# Patient Record
Sex: FEMALE | URBAN - METROPOLITAN AREA
[De-identification: names, ages, dates, MRNs, and addresses within clinical notes are randomized per-mention and may not be internally consistent; named-entity substitution may affect disease eponyms.]

---

## 2018-04-23 ENCOUNTER — APPOINTMENT (RX ONLY)
Dept: URBAN - METROPOLITAN AREA CLINIC 137 | Facility: CLINIC | Age: 78
Setting detail: DERMATOLOGY
End: 2018-04-23

## 2018-04-23 VITALS
HEIGHT: 65 IN | WEIGHT: 150 LBS | HEART RATE: 72 BPM | SYSTOLIC BLOOD PRESSURE: 156 MMHG | RESPIRATION RATE: 16 BRPM | DIASTOLIC BLOOD PRESSURE: 101 MMHG

## 2018-04-23 DIAGNOSIS — L57.0 ACTINIC KERATOSIS: ICD-10-CM

## 2018-04-23 DIAGNOSIS — L82.1 OTHER SEBORRHEIC KERATOSIS: ICD-10-CM

## 2018-04-23 PROBLEM — Z85.828 PERSONAL HISTORY OF OTHER MALIGNANT NEOPLASM OF SKIN: Status: ACTIVE | Noted: 2018-04-23

## 2018-04-23 PROBLEM — L55.1 SUNBURN OF SECOND DEGREE: Status: ACTIVE | Noted: 2018-04-23

## 2018-04-23 PROCEDURE — 99203 OFFICE O/P NEW LOW 30 MIN: CPT | Mod: 25

## 2018-04-23 PROCEDURE — ? LIQUID NITROGEN (CM)

## 2018-04-23 PROCEDURE — 17000 DESTRUCT PREMALG LESION: CPT

## 2018-04-23 PROCEDURE — ? COUNSELING

## 2018-04-23 PROCEDURE — 17003 DESTRUCT PREMALG LES 2-14: CPT

## 2018-04-23 ASSESSMENT — LOCATION SIMPLE DESCRIPTION DERM
LOCATION SIMPLE: LEFT HAND
LOCATION SIMPLE: LEFT WRIST
LOCATION SIMPLE: RIGHT HAND

## 2018-04-23 ASSESSMENT — LOCATION DETAILED DESCRIPTION DERM
LOCATION DETAILED: LEFT DORSAL WRIST
LOCATION DETAILED: LEFT ULNAR DORSAL HAND
LOCATION DETAILED: LEFT DORSAL SMALL FINGER METACARPOPHALANGEAL JOINT
LOCATION DETAILED: LEFT RADIAL DORSAL HAND
LOCATION DETAILED: LEFT DORSAL MIDDLE FINGER METACARPOPHALANGEAL JOINT
LOCATION DETAILED: RIGHT RADIAL DORSAL HAND

## 2018-04-23 ASSESSMENT — LOCATION ZONE DERM
LOCATION ZONE: HAND
LOCATION ZONE: ARM

## 2018-04-23 ASSESSMENT — PAIN INTENSITY VAS: HOW INTENSE IS YOUR PAIN 0 BEING NO PAIN, 10 BEING THE MOST SEVERE PAIN POSSIBLE?: NO PAIN

## 2018-04-23 NOTE — PROCEDURE: LIQUID NITROGEN
Duration Of Freeze Thaw-Cycle (Seconds): 10
Size Of Lesion In Cm (Optional): 0.8
X Size Of Lesion In Cm (Optional): 0
Post-Care Instructions: I reviewed with the patient in detail post-care instructions. Patient is to wear sunprotection, and avoid picking at any of the treated lesions. Pt may apply Vaseline to crusted or scabbing areas.
Size Of Lesion In Cm (Optional): 0.3
Size Of Lesion In Cm (Optional): 0.5
Size Of Lesion In Cm (Optional): 0.7
Size Of Lesion In Cm (Optional): 0.4
Number Of Freeze-Thaw Cycles: 1 freeze-thaw cycle
Size Of Lesion In Cm (Optional): 1
Size Of Lesion In Cm (Optional): 0.2
Consent: Liquid N2 was applied with the cry-ac to «*» actinic type keratoses. Post cryo care was explained in detail. Return prn anything but the anticipated result of smooth, flat, whitish scar(s)--i.e., persistent discolored &/or tender &/or rough spot(s)-- or infection (explained). Return for FU in «*» days, prn before as discussed.  Potential for development of a white (hypo pigmented) scar at cryo site(s) emphasized prior to performing the cryo-Rx with pt. accepting the risk.
Detail Level: Detailed

## 2018-04-23 NOTE — HPI: SKIN LESIONS
How Severe Is Your Skin Lesion?: mild
Have Your Skin Lesions Been Treated?: not been treated
Is This A New Presentation, Or A Follow-Up?: Skin Lesions
Which Family Member (Optional)?: Mother, Grandfather
Additional History: Wears sunscreen sparingly in winter, daily in summer. SPF 30. Patient has a history of skin cancer.

## 2019-08-07 ENCOUNTER — APPOINTMENT (RX ONLY)
Dept: URBAN - METROPOLITAN AREA CLINIC 137 | Facility: CLINIC | Age: 79
Setting detail: DERMATOLOGY
End: 2019-08-07

## 2019-08-07 VITALS — HEART RATE: 97 BPM | OXYGEN SATURATION: 94 % | SYSTOLIC BLOOD PRESSURE: 167 MMHG | DIASTOLIC BLOOD PRESSURE: 75 MMHG

## 2019-08-07 DIAGNOSIS — L57.0 ACTINIC KERATOSIS: ICD-10-CM

## 2019-08-07 DIAGNOSIS — L82.1 OTHER SEBORRHEIC KERATOSIS: ICD-10-CM

## 2019-08-07 PROCEDURE — 99213 OFFICE O/P EST LOW 20 MIN: CPT | Mod: 25

## 2019-08-07 PROCEDURE — 17004 DESTROY PREMAL LESIONS 15/>: CPT

## 2019-08-07 PROCEDURE — ? COUNSELING

## 2019-08-07 PROCEDURE — ? LIQUID NITROGEN (CM)

## 2019-08-07 PROCEDURE — 17110 DESTRUCTION B9 LES UP TO 14: CPT | Mod: 59

## 2019-08-07 ASSESSMENT — LOCATION SIMPLE DESCRIPTION DERM
LOCATION SIMPLE: RIGHT FOREARM
LOCATION SIMPLE: LEFT ELBOW
LOCATION SIMPLE: RIGHT WRIST
LOCATION SIMPLE: RIGHT GREAT TOE
LOCATION SIMPLE: RIGHT INDEX FINGER
LOCATION SIMPLE: LEFT INDEX FINGER
LOCATION SIMPLE: LEFT FOREARM
LOCATION SIMPLE: LEFT HAND
LOCATION SIMPLE: RIGHT 3RD TOE
LOCATION SIMPLE: RIGHT HAND

## 2019-08-07 ASSESSMENT — LOCATION DETAILED DESCRIPTION DERM
LOCATION DETAILED: LEFT ULNAR DORSAL HAND
LOCATION DETAILED: RIGHT DORSAL 3RD TOE
LOCATION DETAILED: LEFT ELBOW
LOCATION DETAILED: RIGHT DISTAL DORSAL FOREARM
LOCATION DETAILED: RIGHT PROXIMAL DORSAL FOREARM
LOCATION DETAILED: LEFT PROXIMAL DORSAL FOREARM
LOCATION DETAILED: LEFT RADIAL DORSAL HAND
LOCATION DETAILED: RIGHT ULNAR DORSAL HAND
LOCATION DETAILED: RIGHT PROXIMAL DORSAL INDEX FINGER
LOCATION DETAILED: RIGHT DORSAL WRIST
LOCATION DETAILED: LEFT PROXIMAL DORSAL INDEX FINGER
LOCATION DETAILED: RIGHT DORSAL GREAT TOE
LOCATION DETAILED: RIGHT RADIAL DORSAL HAND

## 2019-08-07 ASSESSMENT — LOCATION ZONE DERM
LOCATION ZONE: HAND
LOCATION ZONE: ARM
LOCATION ZONE: FINGER
LOCATION ZONE: TOE

## 2019-08-07 ASSESSMENT — TOTAL NUMBER OF LESIONS: # OF LESIONS?: 22

## 2019-08-07 NOTE — PROCEDURE: LIQUID NITROGEN
Size Of Lesion In Cm (Optional): 1.5
Size Of Lesion In Cm (Optional): 1.7
X Size Of Lesion In Cm (Optional): 0
Include Z78.9 (Other Specified Conditions Influencing Health Status) As An Associated Diagnosis?: No
Size Of Lesion In Cm (Optional): 1.3
Size Of Lesion In Cm (Optional): 1
Medical Necessity Clause: This procedure was medically necessary because the lesion that was treated  is infectious, capable of auto innoculation & spread to other indviduals.
Detail Level: Detailed
Size Of Lesion In Cm (Optional): 0.9
Size Of Lesion In Cm (Optional): 0.4
Size Of Lesion In Cm (Optional): 0.2
Size Of Lesion In Cm (Optional): 0.7
Post-Care Instructions: I reviewed with the patient in detail post-care instructions. Patient is to wear sunprotection, and avoid picking at any of the treated lesions. Pt may apply Vaseline to crusted or scabbing areas.
Size Of Lesion In Cm (Optional): 0.3
Medical Necessity Information: It is in your best interest to select a reason for this procedure from the list below. All of these items fulfill various CMS LCD requirements except the new and changing color options.
Size Of Lesion In Cm (Optional): 0.8
Duration Of Freeze Thaw-Cycle (Seconds): 10
Size Of Lesion In Cm (Optional): 0.5
Consent: The patient's consent was obtained including but not limited to risks of crusting, scabbing, blistering, scarring, darker or lighter pigmentary change, recurrence, incomplete removal and infection.
Number Of Freeze-Thaw Cycles: 1 freeze-thaw cycle
Consent: Liquid N2 was applied with the cry-ac to «22» actinic type keratosis. Post cryo care was explained in detail. Return prn anything but the anticipated result of smooth, flat, whitish scar(s)--i.e., persistent discolored &/or tender &/or rough spot(s)-- or infection (explained). Return for FU in «120» days, prn before as discussed.  Potential for development of a white (hypo pigmented) scar at cryo site(s) emphasized prior to performing the cryo-Rx with pt. accepting the risk.
Size Of Lesion In Cm (Optional): 1.1